# Patient Record
Sex: MALE | Race: WHITE | NOT HISPANIC OR LATINO | Employment: UNEMPLOYED | URBAN - METROPOLITAN AREA
[De-identification: names, ages, dates, MRNs, and addresses within clinical notes are randomized per-mention and may not be internally consistent; named-entity substitution may affect disease eponyms.]

---

## 2017-10-25 ENCOUNTER — TELEPHONE (OUTPATIENT)
Dept: PSYCHIATRY | Facility: CLINIC | Age: 22
End: 2017-10-25
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
## 2017-11-14 ENCOUNTER — TELEPHONE (OUTPATIENT)
Dept: PSYCHIATRY | Facility: CLINIC | Age: 22
End: 2017-11-14

## 2017-11-14 NOTE — TELEPHONE ENCOUNTER
First Episode Psychosis   Strengths Program  Outgoing Voicemail Message  [facility]      Left a detailed voicemail message for Maria Antonia Cordero ([phone]) on 11/3/17 and 11/14/17 introducing self, reason for call;  Following up on a referral to the First Episode of Psychosis program previously made by former VIJAY Andre, calling to get addt info and discussing scheduling options.  Writer requested call back. Included writer's direct contact information and availability.     Plan: Will follow-up again later this week, will await return call.     Please call or EPIC message with any questions or concerns.    CARMEN Vincent, SW  [phone]

## 2017-11-16 ENCOUNTER — TELEPHONE (OUTPATIENT)
Dept: PSYCHIATRY | Facility: CLINIC | Age: 22
End: 2017-11-16

## 2017-11-16 NOTE — TELEPHONE ENCOUNTER
First Episode Psychosis Program   Referral Follow-up  [facility]      Patient Name:  Shawn Gunderson  /Age:  1995 (22 year old)    Intervention: PC to Ramón Em CM ([phone]).  This is my third and final attempt. VMM left with a detailed message.      Status of Referral: Pending.     Plan: Phone screen needed.  Will determine if appropriate for a comprehensive assessment after phone screen is completed.  Requested CM call me back either way regarding pt's interest to continue to pursue this referral.  Will await return call.       Please call or EPIC message with any questions or concerns.    CARMEN Vincent, [facility]  [phone]

## 2017-11-27 ENCOUNTER — TELEPHONE (OUTPATIENT)
Dept: PSYCHIATRY | Facility: CLINIC | Age: 22
End: 2017-11-27

## 2017-11-27 NOTE — TELEPHONE ENCOUNTER
First Episode Psychosis Program   Referral Follow-up  [facility]      Patient Name:  Shawn Gunderson  /Age:  1995 (22 year old)    Intervention: Received an additional in-basket referral from pt's , as they were attempting to reach me for completing a phone screen.        JEWELS FW: FE referral from   Received: 6 days ago       Yu Steiner, [facility]  Candie Milligan LGSW       Phone Number: [phone]                     You previously managed a referral from this patient's CM.... So sending you this one. Thanks! Yu            Previous Messages       ----- Message -----      From: Margaux Ly      Sent: 2017  10:01 AM        To: Me First Episode Referral   Subject: FE referral from            FE Referral from Family     -Caller's Name: Gil   -Phone: [phone], Is it okay to leave a detailed voicemail: Yes   -Relationship:    -Patient's Name: Kiko Gunderosn   -: 1995   -Phone: [phone]   -Does the patient know you are calling? Yes     -Any current or past diagnoses? Depression, Bipolar, Schizophrenia, delusions   -If yes, when were they diagnosed? Dx years ago   -How long has patient been taking medication to treat psychosis?   -Have they ever had a significant head injury? Not to their knowledge   -Is there a history of a diagnosis of autism spectrum disorder? No   -Is there a history of a diagnosis of borderline personality disorder? No   -Is here a history of a developmental delay? No     -What are the patient's current symptoms and how long have they been experiencing them?   He doesn't talk a lot to anyone. He is always outside smoking cigarettes. He walks around the perimeter of the house for hours and is talking to himself. He has always believed the government is hacked into his phone and watching him.     -Is the patient seeing any mental health providers  currently, and if so, who/where? [facility]     -What services is are you interested in (e.g. med management, individual therapy, family therapy, group therapy)? Med Management and therapy     -Are you interested in our First Episode Psychosis Family Education & Support Group? No     ROUTE this message/referral to the following pool: P  ME FIRST EPISODE REFERRAL ([id number])                           Status of Referral: Pending    Plan: Will contact to complete phone screen and scheduling if appropriate.        Please call or EPIC message with any questions or concerns.    CARMEN Vincent, [facility]  [phone]

## 2017-11-27 NOTE — TELEPHONE ENCOUNTER
First Episode Psychosis Program   Referral Follow-up  [facility]      Patient Name:  Shawn Gunderson  /Age:  1995 (22 year old)    Intervention: Brief PC with sean Cordero's .  He was unable to speak in detail at the time due to privacy concerns while out in the community.  We arranged to talk tomorrow 17 at 3pm.      Status of Referral: Pending.    Plan: Will complete phone screen.      Get more details about patient's current involvement with mental health services, and what he has received this far, to determine if the First Episode of Psychosis, Strengths Program is able to offer anything different which would impact his care and trajectory for recovery.  If unable to schedule within the Strengths Program, will coordinate with our intake team to be set up an appointment with a Nurse Practitioner or Resident for ongoing care.      Information observed from chart impacting acceptance to Strengths Program:  Multiple Episode schizophrenia with first dx in , living in group home (capacity to engage in treatment).        CARMEN Vincent, ALEA  First Episode Psychosis Program    & Family Clinician  Direct Phone: [phone]  Fax: [phone]    [facility]  [facility], 2nd floor  [address], [address]  [place] [address]

## 2017-11-28 ENCOUNTER — TELEPHONE (OUTPATIENT)
Dept: PSYCHIATRY | Facility: CLINIC | Age: 22
End: 2017-11-28

## 2017-11-28 NOTE — TELEPHONE ENCOUNTER
First Episode Psychosis Program   Referral Follow-up  [facility]      Patient Name:  Shawn Gunderson  /Age:  1995 (22 year old)    Intervention:   Left a detailed voicemail message for tG Cordero's  ([phone]) introducing self, reason for call;  Following up on the referral and to complete phone screen.  Writer requested call back. Included writer's direct contact information and availability.     Status of Referral: Pending    Plan: Await return call.  Attempt again in the near future if I do not hear back.        Please call or EPIC message with any questions or concerns.    CARMEN Vincent, [facility]  [phone]

## 2017-11-29 ENCOUNTER — TELEPHONE (OUTPATIENT)
Dept: PSYCHIATRY | Facility: CLINIC | Age: 22
End: 2017-11-29
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
## 2018-01-09 ENCOUNTER — TELEPHONE (OUTPATIENT)
Dept: PSYCHIATRY | Facility: CLINIC | Age: 23
End: 2018-01-09

## 2018-01-09 NOTE — TELEPHONE ENCOUNTER
First Episode Psychosis Program   Referral Follow-up  [facility]      Patient Name:  Shawn Gunderson  /Age:  1995 (22 year old)    Intervention: Spoke with patient's group home staff regarding further scheduling with First Episode Program.  Informed by staff that patient is currently in extended hospitalization and they will not be scheduling at this time.  Group home staff will contact clinic after patient is discharged if they would like to proceed with scheduling.  Patient has been conditionally approved to schedule an evaluation in an overbook spot.  Scheduling options will be reassessed once clinic is contacted again.    Status of Referral: declined services at this time    Plan: will await call back      Margaux Lawson,

## 2020-08-17 PROBLEM — S82.51XK: Status: ACTIVE | Noted: 2019-08-21

## 2020-08-17 PROBLEM — I10 HYPERTENSION, BENIGN ESSENTIAL, GOAL BELOW 140/90: Status: ACTIVE | Noted: 2017-03-13

## 2020-08-17 PROBLEM — N28.9 RENAL DYSFUNCTION: Status: ACTIVE | Noted: 2019-08-29

## 2020-08-17 PROBLEM — F11.20 OPIOID TYPE DEPENDENCE, CONTINUOUS (H): Status: ACTIVE | Noted: 2020-08-17

## 2020-08-17 PROBLEM — F48.9 MENTAL HEALTH PROBLEM: Status: ACTIVE | Noted: 2020-03-13

## 2020-08-17 PROBLEM — F90.9 ATTENTION DEFICIT HYPERACTIVITY DISORDER (ADHD): Status: ACTIVE | Noted: 2017-06-29

## 2020-08-17 PROBLEM — Z72.0 TOBACCO ABUSE: Status: ACTIVE | Noted: 2020-04-21

## 2020-08-17 PROBLEM — F81.9 SPECIFIC DEVELOPMENTAL LEARNING DIFFICULTY: Status: ACTIVE | Noted: 2020-08-17

## 2020-08-17 PROBLEM — F41.1 ANXIETY STATE: Status: ACTIVE | Noted: 2020-08-17

## 2020-08-17 PROBLEM — F19.951: Status: ACTIVE | Noted: 2020-03-19

## 2020-08-17 PROBLEM — T48.3X4A: Status: ACTIVE | Noted: 2020-03-19

## 2020-08-17 NOTE — PROGRESS NOTES
"Subjective     Shawn Gunderson is a 25 year old male who presents to clinic today for the following health issues:    HPI       New Patient/Transfer of Care  Is looking for a referral for mental health services to be scheduled with a therapist.    Has not had blood test for therapeutic drug monitoring \"in a while\".    Treated for bipolar disorder, anxiety and insomnia.  Bipolar currently manic active - moderate per patient.    Needs psychiatrist - would like to got o [facility] - been there before.    Denies thoughts of self harm or harming others.    Patient states he last used meth and dextromehtorphan in may 2020.    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     Complex regional pain syndrome i of right lower limb     Depressive disorder     Dextromethorphan overdose, undetermined intent, initial encounter     Drug psychosis with hallucinations (H)     Hyperlipidemia     Hypertension, benign essential, goal below 140/90     Lisfranc dislocation     Mental health problem     Opioid abuse (H)     Opioid type dependence, continuous (H)     Other insomnia     Bipolar affective disorder, currently manic, moderate (H)     SCAR (generalized anxiety disorder)     Past Surgical History:   Procedure Laterality Date     DENTAL SURGERY      Had tooth get pushed into gums 2/2 traumatic injury.      Left thumb dislocation      Following blunt force trauma injury.      " "ORCHIECTOMY INGUINAL Right 10/10/2014    Procedure: ORCHIECTOMY INGUINAL;  Surgeon: Cameron Tavera MD;  Location: [facility] OR     Right hand punching injury repair      With severed nerves       Social History     Tobacco Use     Smoking status: Current Every Day Smoker     Packs/day: 0.25     Years: 6.00     Pack years: 1.50     Smokeless tobacco: Never Used   Substance Use Topics     Alcohol use: No     Alcohol/week: 1.7 standard drinks     Types: 2 Shots of liquor per week     Frequency: Never     Comment: no     Family History   Problem Relation Age of Onset     Depression Mother      Anxiety Disorder Mother      Bipolar Disorder Father      Breast Cancer Paternal Grandmother         59     Substance Abuse Brother         heroin     Anxiety Disorder Other         Runs on both sides of the family.          Current Outpatient Medications   Medication Sig Dispense Refill     LATUDA 60 MG TABS tablet take one tablet EVERY DAY       melatonin 3 MG tablet Take 6 mg by mouth       OLANZapine (ZYPREXA) 10 MG tablet Take 10 mg by mouth       OXcarbazepine (TRILEPTAL) 300 MG tablet take 2 tablets (600mg) TWICE DAILY       traZODone (DESYREL) 50 MG tablet Take 2 tablets (100 mg) by mouth nightly as needed for other (insomnia) 180 tablet 3     trihexyphenidyl (ARTANE) 2 MG tablet Take 3 mg by mouth       Allergies   Allergen Reactions     Haloperidol Muscle Pain (Myalgia) and Other (See Comments)     \"makes me feel like I'm withdrawing off opioids\"  Thick tongue and restless legs  Thick tongue and restless legs  Restless leg       Risperidone Muscle Pain (Myalgia) and Other (See Comments)     \"gives me bad activation\"  Subjective reports of gynecomastia  Subjective reports of gynecomastia  gynecomastia         Reviewed and updated as needed this visit by Provider  Tobacco  Allergies  Meds  Problems  Med Hx  Surg Hx  Fam Hx         Review of Systems   C: NEGATIVE for fever, chills, change in weight  I: NEGATIVE " "for worrisome rashes, moles or lesions  E: NEGATIVE for vision changes or irritation  E/M: NEGATIVE for ear, mouth and throat problems  R: NEGATIVE for significant cough or SOB  CV: NEGATIVE for chest pain, palpitations or peripheral edema  GI: NEGATIVE for nausea, abdominal pain, heartburn, or change in bowel habits  : NEGATIVE for frequency, dysuria, or hematuria  N: NEGATIVE for weakness, dizziness or paresthesias  E: NEGATIVE for temperature intolerance, skin/hair changes  PSYCHIATRIC: see above      Objective    /88   Pulse 93   Temp 98.2  F (36.8  C) (Tympanic)   Resp 12   Ht 1.803 m (5' 11\")   Wt 106.9 kg (235 lb 9.6 oz)   SpO2 97%   BMI 32.86 kg/m    Body mass index is 32.86 kg/m .  Physical Exam   GENERAL: alert and no distress  EYES: no icterus, PERRLA  NECK: no tenderness, no adenopathy,  Thyroid not enlarged  RESP: lungs clear to auscultation - no rales, no rhonchi, no wheezes  CV: regular rates and rhythm, no murmur  MS: no edema  SKIN: no jaundice or rash  NEURO: no tremors; no akathesia  PSYCH: well-kempt,  linear thought process, normal speech, good insight; normal mood, appropriate affect, no suicidality, no aggression, no hallucination  ABD: rounded, nontender, liver edge not palpable.    Diagnostic Test Results:  none         Assessment & Plan     Shawn was seen today for [facility] care.    Diagnoses and all orders for this visit:    SCAR (generalized anxiety disorder)  -     MENTAL HEALTH REFERRAL  - Adult; Psychiatry; Psychiatry; Other: [facility] [phone]; We will contact you to schedule the appointment or please call with any questions    Bipolar affective disorder, currently manic, moderate (H)  -     MENTAL HEALTH REFERRAL  - Adult; Psychiatry; Psychiatry; Other: [facility] [phone]; We will contact you to schedule the appointment or please call with any questions    Other insomnia  -     traZODone (DESYREL) 50 MG tablet; Take 2 tablets (100 mg) by " mouth nightly as needed for other (insomnia)  -     MENTAL HEALTH REFERRAL  - Adult; Psychiatry; Psychiatry; Other: [facility] [phone]; We will contact you to schedule the appointment or please call with any questions    Encounter for therapeutic drug monitoring  -     CBC with platelets differential  -     Comprehensive metabolic panel  -     Lipid panel reflex to direct LDL Fasting; Future  -     TSH with free T4 reflex      Patient was advised of possible side effects of his meds.  Lab tests ordered fro therapeutic drug monitoring.  Fasting test to be done later.    Referral to his preferred psychiatry clinic has been placed.    Trazodone Rx verified and refilled. Safe use reinforced.          Patient Instructions   You will be contacted in 1-2 days for results of your lab tests.    Schedule appointment with psychiatry in [place], [facility] - this was your preferred psychiatry provider.    Trazodone prescription refill has been sent to [facility] [place].    Take all medications as prescribed before.    Get a flu shot in October 2020.      Return in about 6 months (around 2/18/2021) for if all conditions are stable..    Mina Rushing MD  [facility]

## 2020-08-18 ENCOUNTER — OFFICE VISIT (OUTPATIENT)
Dept: FAMILY MEDICINE | Facility: CLINIC | Age: 25
End: 2020-08-18
Payer: COMMERCIAL

## 2020-08-18 VITALS
RESPIRATION RATE: 12 BRPM | HEART RATE: 93 BPM | OXYGEN SATURATION: 97 % | SYSTOLIC BLOOD PRESSURE: 134 MMHG | DIASTOLIC BLOOD PRESSURE: 88 MMHG | WEIGHT: 235.6 LBS | HEIGHT: 71 IN | BODY MASS INDEX: 32.98 KG/M2 | TEMPERATURE: 98.2 F

## 2020-08-18 DIAGNOSIS — Z51.81 ENCOUNTER FOR THERAPEUTIC DRUG MONITORING: ICD-10-CM

## 2020-08-18 DIAGNOSIS — G47.09 OTHER INSOMNIA: ICD-10-CM

## 2020-08-18 DIAGNOSIS — F41.1 GAD (GENERALIZED ANXIETY DISORDER): Primary | ICD-10-CM

## 2020-08-18 DIAGNOSIS — F31.12 BIPOLAR AFFECTIVE DISORDER, CURRENTLY MANIC, MODERATE (H): ICD-10-CM

## 2020-08-18 LAB
ALBUMIN SERPL-MCNC: 3.9 G/DL (ref 3.4–5)
ALP SERPL-CCNC: 101 U/L (ref 40–150)
ALT SERPL W P-5'-P-CCNC: 69 U/L (ref 0–70)
ANION GAP SERPL CALCULATED.3IONS-SCNC: 5 MMOL/L (ref 3–14)
AST SERPL W P-5'-P-CCNC: 37 U/L (ref 0–45)
BASOPHILS # BLD AUTO: 0 10E9/L (ref 0–0.2)
BASOPHILS NFR BLD AUTO: 0.3 %
BILIRUB SERPL-MCNC: 0.3 MG/DL (ref 0.2–1.3)
BUN SERPL-MCNC: 22 MG/DL (ref 7–30)
CALCIUM SERPL-MCNC: 8.8 MG/DL (ref 8.5–10.1)
CHLORIDE SERPL-SCNC: 105 MMOL/L (ref 94–109)
CO2 SERPL-SCNC: 25 MMOL/L (ref 20–32)
CREAT SERPL-MCNC: 1 MG/DL (ref 0.66–1.25)
DIFFERENTIAL METHOD BLD: NORMAL
EOSINOPHIL # BLD AUTO: 0.2 10E9/L (ref 0–0.7)
EOSINOPHIL NFR BLD AUTO: 2.7 %
ERYTHROCYTE [DISTWIDTH] IN BLOOD BY AUTOMATED COUNT: 12.7 % (ref 10–15)
GFR SERPL CREATININE-BSD FRML MDRD: >90 ML/MIN/{1.73_M2}
GLUCOSE SERPL-MCNC: 101 MG/DL (ref 70–99)
HCT VFR BLD AUTO: 46.3 % (ref 40–53)
HGB BLD-MCNC: 15.5 G/DL (ref 13.3–17.7)
LYMPHOCYTES # BLD AUTO: 2.7 10E9/L (ref 0.8–5.3)
LYMPHOCYTES NFR BLD AUTO: 42.5 %
MCH RBC QN AUTO: 28.2 PG (ref 26.5–33)
MCHC RBC AUTO-ENTMCNC: 33.5 G/DL (ref 31.5–36.5)
MCV RBC AUTO: 84 FL (ref 78–100)
MONOCYTES # BLD AUTO: 0.7 10E9/L (ref 0–1.3)
MONOCYTES NFR BLD AUTO: 11.9 %
NEUTROPHILS # BLD AUTO: 2.7 10E9/L (ref 1.6–8.3)
NEUTROPHILS NFR BLD AUTO: 42.6 %
PLATELET # BLD AUTO: 202 10E9/L (ref 150–450)
POTASSIUM SERPL-SCNC: 4.2 MMOL/L (ref 3.4–5.3)
PROT SERPL-MCNC: 7.7 G/DL (ref 6.8–8.8)
RBC # BLD AUTO: 5.5 10E12/L (ref 4.4–5.9)
SODIUM SERPL-SCNC: 135 MMOL/L (ref 133–144)
TSH SERPL DL<=0.005 MIU/L-ACNC: 1.63 MU/L (ref 0.4–4)
WBC # BLD AUTO: 6.2 10E9/L (ref 4–11)

## 2020-08-18 PROCEDURE — 99204 OFFICE O/P NEW MOD 45 MIN: CPT | Performed by: FAMILY MEDICINE

## 2020-08-18 PROCEDURE — 80050 GENERAL HEALTH PANEL: CPT | Performed by: FAMILY MEDICINE

## 2020-08-18 PROCEDURE — 36415 COLL VENOUS BLD VENIPUNCTURE: CPT | Performed by: FAMILY MEDICINE

## 2020-08-18 RX ORDER — OLANZAPINE 10 MG/1
10 TABLET ORAL
COMMUNITY
Start: 2020-06-09

## 2020-08-18 RX ORDER — TRIHEXYPHENIDYL HYDROCHLORIDE 2 MG/1
3 TABLET ORAL
COMMUNITY
Start: 2020-06-09 | End: 2020-08-26

## 2020-08-18 RX ORDER — LURASIDONE HYDROCHLORIDE 60 MG/1
TABLET, FILM COATED ORAL
COMMUNITY
Start: 2020-08-04 | End: 2020-08-27

## 2020-08-18 RX ORDER — TRAZODONE HYDROCHLORIDE 50 MG/1
100 TABLET, FILM COATED ORAL
Qty: 180 TABLET | Refills: 3 | Status: SHIPPED | OUTPATIENT
Start: 2020-08-18 | End: 2021-04-06

## 2020-08-18 RX ORDER — OXCARBAZEPINE 300 MG/1
TABLET, FILM COATED ORAL
COMMUNITY
Start: 2020-08-04 | End: 2020-08-27

## 2020-08-18 RX ORDER — LANOLIN ALCOHOL/MO/W.PET/CERES
6 CREAM (GRAM) TOPICAL
COMMUNITY
Start: 2020-06-09 | End: 2020-08-27

## 2020-08-18 SDOH — HEALTH STABILITY: MENTAL HEALTH: HOW OFTEN DO YOU HAVE A DRINK CONTAINING ALCOHOL?: NEVER

## 2020-08-18 ASSESSMENT — MIFFLIN-ST. JEOR: SCORE: 2065.8

## 2020-08-18 NOTE — PATIENT INSTRUCTIONS
You will be contacted in 1-2 days for results of your lab tests.    Schedule appointment with psychiatry in [facility] - this was your preferred psychiatry provider.    Trazodone prescription refill has been sent to [facility] [place].    Take all medications as prescribed before.    Get a flu shot in October 2020.

## 2020-08-18 NOTE — NURSING NOTE
"Initial /88   Pulse 93   Temp 98.2  F (36.8  C) (Tympanic)   Resp 12   Ht 1.803 m (5' 11\")   Wt 106.9 kg (235 lb 9.6 oz)   SpO2 97%   BMI 32.86 kg/m   Estimated body mass index is 32.86 kg/m  as calculated from the following:    Height as of this encounter: 1.803 m (5' 11\").    Weight as of this encounter: 106.9 kg (235 lb 9.6 oz). .      "

## 2020-08-18 NOTE — LETTER
August 20, 2020      Shawn Maravilla Jalyn  [address] [place] [address]        Dear ,    We are writing to inform you of your test results.    Your test results fall within the expected range(s) or remain unchanged from previous results.  Please continue with current treatment plan.    Resulted Orders   CBC with platelets differential   Result Value Ref Range    WBC 6.2 4.0 - 11.0 10e9/L    RBC Count 5.50 4.4 - 5.9 10e12/L    Hemoglobin 15.5 13.3 - 17.7 g/dL    Hematocrit 46.3 40.0 - 53.0 %    MCV 84 78 - 100 fl    MCH 28.2 26.5 - 33.0 pg    MCHC 33.5 31.5 - 36.5 g/dL    RDW 12.7 10.0 - 15.0 %    Platelet Count 202 150 - 450 10e9/L    % Neutrophils 42.6 %    % Lymphocytes 42.5 %    % Monocytes 11.9 %    % Eosinophils 2.7 %    % Basophils 0.3 %    Absolute Neutrophil 2.7 1.6 - 8.3 10e9/L    Absolute Lymphocytes 2.7 0.8 - 5.3 10e9/L    Absolute Monocytes 0.7 0.0 - 1.3 10e9/L    Absolute Eosinophils 0.2 0.0 - 0.7 10e9/L    Absolute Basophils 0.0 0.0 - 0.2 10e9/L    Diff Method Automated Method    Comprehensive metabolic panel   Result Value Ref Range    Sodium 135 133 - 144 mmol/L    Potassium 4.2 3.4 - 5.3 mmol/L    Chloride 105 94 - 109 mmol/L    Carbon Dioxide 25 20 - 32 mmol/L    Anion Gap 5 3 - 14 mmol/L    Glucose 101 (H) 70 - 99 mg/dL      Comment:      Non Fasting    Urea Nitrogen 22 7 - 30 mg/dL    Creatinine 1.00 0.66 - 1.25 mg/dL    GFR Estimate >90 >60 mL/min/[1.73_m2]      Comment:      Non  GFR Calc  Starting 12/18/2018, serum creatinine based estimated GFR (eGFR) will be   calculated using the Chronic Kidney Disease Epidemiology Collaboration   (CKD-EPI) equation.      GFR Estimate If Black >90 >60 mL/min/[1.73_m2]      Comment:       GFR Calc  Starting 12/18/2018, serum creatinine based estimated GFR (eGFR) will be   calculated using the Chronic Kidney Disease Epidemiology Collaboration   (CKD-EPI) equation.      Calcium 8.8 8.5 - 10.1 mg/dL     Bilirubin Total 0.3 0.2 - 1.3 mg/dL    Albumin 3.9 3.4 - 5.0 g/dL    Protein Total 7.7 6.8 - 8.8 g/dL    Alkaline Phosphatase 101 40 - 150 U/L    ALT 69 0 - 70 U/L    AST 37 0 - 45 U/L   TSH with free T4 reflex   Result Value Ref Range    TSH 1.63 0.40 - 4.00 mU/L       If you have any questions or concerns, please call the clinic at the number listed above.       Sincerely,        Mina Rushing MD

## 2020-08-26 DIAGNOSIS — F31.12 BIPOLAR AFFECTIVE DISORDER, CURRENTLY MANIC, MODERATE (H): Primary | ICD-10-CM

## 2020-08-26 RX ORDER — TRIHEXYPHENIDYL HYDROCHLORIDE 2 MG/1
TABLET ORAL
Qty: 45 TABLET | Refills: 0 | Status: SHIPPED | OUTPATIENT
Start: 2020-08-26 | End: 2020-08-26

## 2020-08-26 RX ORDER — TRIHEXYPHENIDYL HYDROCHLORIDE 2 MG/1
TABLET ORAL
Qty: 135 TABLET | Refills: 0 | Status: SHIPPED | OUTPATIENT
Start: 2020-08-26 | End: 2020-09-16

## 2020-08-26 NOTE — TELEPHONE ENCOUNTER
Patient was supposed to see phsyciatry.  Artane should be prescribed by psychiatry.    Refilled this only for 30 days. He needs to see psychiatry for his multiple meds.    Please remind patient of the above. Please call patient.

## 2020-08-26 NOTE — TELEPHONE ENCOUNTER
Requested Prescriptions   Pending Prescriptions Disp Refills     trihexyphenidyl (ARTANE) 2 MG tablet [Pharmacy Med Name: trihexyphenidyl 2 mg tablet] 135 tablet 0     Sig: Take 1&1/2 tablets(3mg) three times a day       There is no refill protocol information for this order

## 2020-08-26 NOTE — LETTER
August 27, 2020      Shawn Downeysey Jalyn  [address] [place] [address]        Dear Shawn,     We received a refill request for your Artane, Latuda, melatonin, Zyprexa, Trileptal, and trazodone medications.  This medication has been refilled.  Artane was refilled for 30 days as this medication should be prescribed by psychiatry.  Please schedule an appointment with your pshychiatrist for refills of this medication.    Sincerely,        Mina Rushing MD

## 2020-08-26 NOTE — TELEPHONE ENCOUNTER
Routing refill request to provider for review/approval because:  Medication is reported/historical    Meron East RN

## 2020-08-28 RX ORDER — OLANZAPINE 10 MG/1
TABLET ORAL
Qty: 30 TABLET | Refills: 0 | OUTPATIENT
Start: 2020-08-28

## 2020-08-28 NOTE — TELEPHONE ENCOUNTER
"Requested Prescriptions   Pending Prescriptions Disp Refills     OLANZapine (ZYPREXA) 10 MG tablet [Pharmacy Med Name: olanzapine 10 mg tablet] 30 tablet 0     Sig: take one tablet EVERY DAY AS NEEDED agitation       Antipsychotic Medications Failed - 8/27/2020  1:29 PM        Failed - Lipid panel on file within the past 12 months     No lab results found.            Passed - Blood pressure under 140/90 in past 12 months     BP Readings from Last 3 Encounters:   08/18/20 134/88   10/12/15 121/84   10/08/15 127/73                 Passed - Patient is 12 years of age or older        Passed - CBC on file in past 12 months     Recent Labs   Lab Test 08/18/20  1655   WBC 6.2   RBC 5.50   HGB 15.5   HCT 46.3                    Passed - Heart Rate on file within past 12 months     Pulse Readings from Last 3 Encounters:   08/18/20 93   10/12/15 99   10/08/15 89               Passed - A1c or Glucose on file in past 12 months     Recent Labs   Lab Test 08/18/20  1655   *       Please review patients last 3 weights. If a weight gain of >10 lbs exists, you may refill the prescription once after instructing the patient to schedule an appointment within the next 30 days.    Wt Readings from Last 3 Encounters:   08/18/20 106.9 kg (235 lb 9.6 oz)   10/12/15 102.8 kg (226 lb 9.6 oz)   10/08/15 103.5 kg (228 lb 1.6 oz)             Passed - Medication is active on med list        Passed - Recent (6 mo) or future (30 days) visit within the authorizing provider's specialty     Patient had office visit in the last 6 months or has a visit in the next 30 days with authorizing provider or within the authorizing provider's specialty.  See \"Patient Info\" tab in inbasket, or \"Choose Columns\" in Meds & Orders section of the refill encounter.                 "

## 2020-09-16 ENCOUNTER — TELEPHONE (OUTPATIENT)
Dept: FAMILY MEDICINE | Facility: CLINIC | Age: 25
End: 2020-09-16

## 2020-09-16 DIAGNOSIS — F31.12 BIPOLAR AFFECTIVE DISORDER, CURRENTLY MANIC, MODERATE (H): ICD-10-CM

## 2020-09-16 RX ORDER — TRIHEXYPHENIDYL HYDROCHLORIDE 2 MG/1
TABLET ORAL
Qty: 135 TABLET | Refills: 0 | Status: SHIPPED | OUTPATIENT
Start: 2020-09-16 | End: 2021-04-06

## 2020-09-16 NOTE — TELEPHONE ENCOUNTER
Please contact patient.  Patient needs to see psychiatry to continue all his other medication refills.  He has been referred to one in the city per his request but it does not seem he has schedule this.  For psych med management, I strongly advise that he be referred to collaborative psychiatry if he is unable to go to his preferred psychiatry clinic.

## 2020-09-16 NOTE — LETTER
September 23, 2020      Shawn Maravilla Jalyn  [address] [place] [address]        Dear Shawn,     We received a refill request for your trihexyphenidyl medication.  This medication has been refilled for 30 days, however, you are to contact your psychiatrist for future refills.  If you have not scheduled an appointment, please schedule an appointment with your psychiatrist so you do not run out of medication.  Please call [phone] if you have any questions.        Sincerely,        Mina Rushing MD

## 2020-09-16 NOTE — TELEPHONE ENCOUNTER
Requested Prescriptions   Pending Prescriptions Disp Refills     trihexyphenidyl (ARTANE) 2 MG tablet [Pharmacy Med Name: trihexyphenidyl 2 mg tablet] 135 tablet 0     Sig: Take 1&1/2 tablets(3mg) three times a day..       There is no refill protocol information for this order

## 2020-09-23 NOTE — TELEPHONE ENCOUNTER
3rd attempt.  Left message for patient to return call to clinic.  Letter sent.  Barbi CERVANTES RN BSN

## 2021-03-24 ENCOUNTER — MEDICAL CORRESPONDENCE (OUTPATIENT)
Dept: HEALTH INFORMATION MANAGEMENT | Facility: CLINIC | Age: 26
End: 2021-03-24

## 2021-03-30 ENCOUNTER — MEDICAL CORRESPONDENCE (OUTPATIENT)
Dept: HEALTH INFORMATION MANAGEMENT | Facility: CLINIC | Age: 26
End: 2021-03-30

## 2021-03-30 DIAGNOSIS — E55.9 AVITAMINOSIS D: Primary | ICD-10-CM

## 2021-03-30 DIAGNOSIS — E55.9 AVITAMINOSIS D: ICD-10-CM

## 2021-03-30 DIAGNOSIS — Z79.899 ENCOUNTER FOR LONG-TERM (CURRENT) USE OF HIGH-RISK MEDICATION: ICD-10-CM

## 2021-03-30 LAB
ALBUMIN SERPL-MCNC: 4 G/DL (ref 3.4–5)
ALP SERPL-CCNC: 93 U/L (ref 40–150)
ALT SERPL W P-5'-P-CCNC: 27 U/L (ref 0–70)
ANION GAP SERPL CALCULATED.3IONS-SCNC: 9 MMOL/L (ref 3–14)
AST SERPL W P-5'-P-CCNC: 17 U/L (ref 0–45)
BASOPHILS # BLD AUTO: 0 10E9/L (ref 0–0.2)
BASOPHILS NFR BLD AUTO: 0.1 %
BILIRUB SERPL-MCNC: 1.7 MG/DL (ref 0.2–1.3)
BUN SERPL-MCNC: 20 MG/DL (ref 7–30)
CALCIUM SERPL-MCNC: 8.5 MG/DL (ref 8.5–10.1)
CHLORIDE SERPL-SCNC: 103 MMOL/L (ref 94–109)
CO2 SERPL-SCNC: 23 MMOL/L (ref 20–32)
CREAT SERPL-MCNC: 1.18 MG/DL (ref 0.66–1.25)
DIFFERENTIAL METHOD BLD: ABNORMAL
EOSINOPHIL # BLD AUTO: 0 10E9/L (ref 0–0.7)
EOSINOPHIL NFR BLD AUTO: 0.1 %
ERYTHROCYTE [DISTWIDTH] IN BLOOD BY AUTOMATED COUNT: 13.2 % (ref 10–15)
FOLATE SERPL-MCNC: 13.7 NG/ML
GFR SERPL CREATININE-BSD FRML MDRD: 84 ML/MIN/{1.73_M2}
GLUCOSE SERPL-MCNC: 114 MG/DL (ref 70–99)
HCT VFR BLD AUTO: 48.2 % (ref 40–53)
HGB BLD-MCNC: 16 G/DL (ref 13.3–17.7)
LITHIUM SERPL-SCNC: 0.49 MMOL/L (ref 0.6–1.2)
LYMPHOCYTES # BLD AUTO: 0.8 10E9/L (ref 0.8–5.3)
LYMPHOCYTES NFR BLD AUTO: 5.9 %
MCH RBC QN AUTO: 27.8 PG (ref 26.5–33)
MCHC RBC AUTO-ENTMCNC: 33.2 G/DL (ref 31.5–36.5)
MCV RBC AUTO: 84 FL (ref 78–100)
MONOCYTES # BLD AUTO: 0.6 10E9/L (ref 0–1.3)
MONOCYTES NFR BLD AUTO: 4.6 %
NEUTROPHILS # BLD AUTO: 11.3 10E9/L (ref 1.6–8.3)
NEUTROPHILS NFR BLD AUTO: 89.3 %
PLATELET # BLD AUTO: 264 10E9/L (ref 150–450)
POTASSIUM SERPL-SCNC: 3.5 MMOL/L (ref 3.4–5.3)
PROLACTIN SERPL-MCNC: 12 UG/L (ref 2–18)
PROT SERPL-MCNC: 8.1 G/DL (ref 6.8–8.8)
RBC # BLD AUTO: 5.76 10E12/L (ref 4.4–5.9)
SODIUM SERPL-SCNC: 135 MMOL/L (ref 133–144)
T3 SERPL-MCNC: 86 NG/DL (ref 60–181)
T4 FREE SERPL-MCNC: 0.88 NG/DL (ref 0.76–1.46)
T4 SERPL-MCNC: 7.6 UG/DL (ref 4.5–13.9)
TSH SERPL DL<=0.005 MIU/L-ACNC: 0.57 MU/L (ref 0.4–4)
VIT B12 SERPL-MCNC: 732 PG/ML (ref 193–986)
WBC # BLD AUTO: 12.7 10E9/L (ref 4–11)

## 2021-03-30 PROCEDURE — 82607 VITAMIN B-12: CPT | Performed by: NURSE PRACTITIONER

## 2021-03-30 PROCEDURE — 80178 ASSAY OF LITHIUM: CPT | Performed by: NURSE PRACTITIONER

## 2021-03-30 PROCEDURE — 82306 VITAMIN D 25 HYDROXY: CPT | Performed by: NURSE PRACTITIONER

## 2021-03-30 PROCEDURE — 80050 GENERAL HEALTH PANEL: CPT | Performed by: NURSE PRACTITIONER

## 2021-03-30 PROCEDURE — 84146 ASSAY OF PROLACTIN: CPT | Performed by: NURSE PRACTITIONER

## 2021-03-30 PROCEDURE — 84439 ASSAY OF FREE THYROXINE: CPT | Performed by: NURSE PRACTITIONER

## 2021-03-30 PROCEDURE — 84480 ASSAY TRIIODOTHYRONINE (T3): CPT | Performed by: NURSE PRACTITIONER

## 2021-03-30 PROCEDURE — 82746 ASSAY OF FOLIC ACID SERUM: CPT | Performed by: NURSE PRACTITIONER

## 2021-03-30 PROCEDURE — 36415 COLL VENOUS BLD VENIPUNCTURE: CPT | Performed by: NURSE PRACTITIONER

## 2021-03-31 LAB — DEPRECATED CALCIDIOL+CALCIFEROL SERPL-MC: 16 UG/L (ref 20–75)

## 2021-04-06 ENCOUNTER — OFFICE VISIT (OUTPATIENT)
Dept: FAMILY MEDICINE | Facility: CLINIC | Age: 26
End: 2021-04-06
Payer: COMMERCIAL

## 2021-04-06 VITALS
OXYGEN SATURATION: 96 % | HEART RATE: 103 BPM | HEIGHT: 72 IN | WEIGHT: 256 LBS | TEMPERATURE: 97.6 F | SYSTOLIC BLOOD PRESSURE: 133 MMHG | BODY MASS INDEX: 34.67 KG/M2 | DIASTOLIC BLOOD PRESSURE: 88 MMHG

## 2021-04-06 DIAGNOSIS — Z00.00 ROUTINE GENERAL MEDICAL EXAMINATION AT A HEALTH CARE FACILITY: Primary | ICD-10-CM

## 2021-04-06 DIAGNOSIS — F25.0 SCHIZOAFFECTIVE DISORDER, BIPOLAR TYPE (H): ICD-10-CM

## 2021-04-06 DIAGNOSIS — F41.1 GAD (GENERALIZED ANXIETY DISORDER): ICD-10-CM

## 2021-04-06 DIAGNOSIS — Z13.6 CARDIOVASCULAR SCREENING; LDL GOAL LESS THAN 160: ICD-10-CM

## 2021-04-06 DIAGNOSIS — Z13.1 SCREENING FOR DIABETES MELLITUS: ICD-10-CM

## 2021-04-06 LAB
CHOLEST SERPL-MCNC: 181 MG/DL
GLUCOSE SERPL-MCNC: 105 MG/DL (ref 70–99)
HDLC SERPL-MCNC: 39 MG/DL
LDLC SERPL CALC-MCNC: 108 MG/DL
NONHDLC SERPL-MCNC: 142 MG/DL
TRIGL SERPL-MCNC: 171 MG/DL

## 2021-04-06 PROCEDURE — 82947 ASSAY GLUCOSE BLOOD QUANT: CPT | Performed by: FAMILY MEDICINE

## 2021-04-06 PROCEDURE — 99395 PREV VISIT EST AGE 18-39: CPT | Performed by: FAMILY MEDICINE

## 2021-04-06 PROCEDURE — 36415 COLL VENOUS BLD VENIPUNCTURE: CPT | Performed by: FAMILY MEDICINE

## 2021-04-06 PROCEDURE — 80061 LIPID PANEL: CPT | Performed by: FAMILY MEDICINE

## 2021-04-06 RX ORDER — LITHIUM CARBONATE 450 MG
2 TABLET, EXTENDED RELEASE ORAL DAILY
COMMUNITY
Start: 2021-04-02

## 2021-04-06 RX ORDER — OLANZAPINE 15 MG/1
1 TABLET ORAL AT BEDTIME
COMMUNITY
Start: 2021-04-02

## 2021-04-06 ASSESSMENT — MIFFLIN-ST. JEOR: SCORE: 2174.21

## 2021-04-06 NOTE — PATIENT INSTRUCTIONS
Preventive Health Recommendations  Male Ages 26 - 39    Yearly exam:             See your health care provider every year in order to  o   Review health changes.   o   Discuss preventive care.    o   Review your medicines if your doctor has prescribed any.    You should be tested each year for STDs (sexually transmitted diseases), if you re at risk.     After age 35, talk to your provider about cholesterol testing. If you are at risk for heart disease, have your cholesterol tested at least every 5 years.     If you are at risk for diabetes, you should have a diabetes test (fasting glucose).  Shots: Get a flu shot each year. Get a tetanus shot every 10 years.     Nutrition:    Eat at least 5 servings of fruits and vegetables daily.     Eat whole-grain bread, whole-wheat pasta and brown rice instead of white grains and rice.     Get adequate Calcium and Vitamin D.     Lifestyle    Exercise for at least 150 minutes a week (30 minutes a day, 5 days a week). This will help you control your weight and prevent disease.     Limit alcohol to one drink per day.     No smoking.     Wear sunscreen to prevent skin cancer.     See your dentist every six months for an exam and cleaning.   At [facility], we strive to deliver an exceptional experience to you, every time we see you. If you receive a survey, please complete it as we do value your feedback.  If you have MyChart, you can expect to receive results automatically within 24 hours of their completion.  Your provider will send a note interpreting your results as well.   If you do not have MyChart, you should receive your results in about a week by mail.    Your care team:                            Family Medicine Internal Medicine   MD Mina Downey MD Shantel Branch-Fleming, MD Srinivasa Vaka, MD Katya Belousova, PA-C Megan Hill, APRN CNP Nam Ho, MD Pediatrics   Zhang Arias,  KIRAN Mann, MD Concepcion Lewis APRN CNP   MD Bibiana Miguel MD Deborah Mielke, MD Kim Thein, APRN [facility]      Clinic hours: Monday - Thursday 7 am-6 pm; Fridays 7 am-5 pm.   Urgent care: Monday - Friday 11 am-9 pm; Saturday and Sunday 9 am-5 pm.    Clinic: [phone]       [facility]: Monday - Thursday 8 am - 7 pm; Friday 8 am - 6 pm  [facility]: [phone]     Use [email or web address] for 24/7 diagnosis and treatment of dozens of conditions.

## 2021-04-06 NOTE — PROGRESS NOTES
SUBJECTIVE:   CC: Shawn Gunderson is an 25 year old male who presents for preventive health visit.       Patient has been advised of split billing requirements and indicates understanding: Yes  Healthy Habits:    Do you get at least three servings of calcium containing foods daily (dairy, green leafy vegetables, etc.)? yes    Amount of exercise or daily activities, outside of work: 7 day(s) per week, 10-15 mins    Problems taking medications regularly No    Medication side effects: No    Have you had an eye exam in the past two years? no    Do you see a dentist twice per year? no    Do you have sleep apnea, excessive snoring or daytime drowsiness?no      -Overeating & vomit episodes / heartburn: unable to give me timeline.     Today's PHQ-2 Score:   PHQ-2 ( 1999 [employer]) 4/6/2021 10/12/2015   Q1: Little interest or pleasure in doing things 0 0   Q2: Feeling down, depressed or hopeless 0 0   PHQ-2 Score 0 0       Abuse: Current or Past(Physical, Sexual or Emotional)- No  Do you feel safe in your environment? Yes    Have you ever done Advance Care Planning? (For example, a Health Directive, POLST, or a discussion with a medical provider or your loved ones about your wishes): No, advance care planning information given to patient to review.  Patient plans to discuss their wishes with loved ones or provider.      Social History     Tobacco Use     Smoking status: Current Every Day Smoker     Packs/day: 0.25     Years: 6.00     Pack years: 1.50     Types: Cigarettes     Smokeless tobacco: Never Used   Substance Use Topics     Alcohol use: No     Alcohol/week: 1.7 standard drinks     Types: 2 Shots of liquor per week     Frequency: Never     Comment: no     If you drink alcohol do you typically have >3 drinks per day or >7 drinks per week? No                      Last PSA: No results found for: PSA    Reviewed orders with patient. Reviewed health maintenance and updated orders accordingly - Yes  Lab work is in  process    Reviewed and updated as needed this visit by clinical staff  Tobacco  Allergies  Meds   Med Hx  Surg Hx  Fam Hx  Soc Hx        Reviewed and updated as needed this visit by Provider                    ROS:  CONSTITUTIONAL: NEGATIVE for fever, chills, change in weight  INTEGUMENTARY/SKIN: NEGATIVE for worrisome rashes, moles or lesions  EYES: NEGATIVE for vision changes or irritation  ENT: NEGATIVE for ear, mouth and throat problems  RESP: NEGATIVE for significant cough or SOB  CV: NEGATIVE for chest pain, palpitations or peripheral edema  GI: NEGATIVE for nausea, abdominal pain, heartburn, or change in bowel habits   male: negative for dysuria, hematuria, decreased urinary stream, erectile dysfunction, urethral discharge  MUSCULOSKELETAL: NEGATIVE for significant arthralgias or myalgia  NEURO: NEGATIVE for weakness, dizziness or paresthesias  PSYCHIATRIC: NEGATIVE for changes in mood or affect    OBJECTIVE:   /88 (BP Location: Right arm, Patient Position: Sitting, Cuff Size: Adult Regular)   Pulse 103   Temp 97.6  F (36.4  C) (Tympanic)   Ht 1.829 m (6')   Wt 116.1 kg (256 lb)   SpO2 96%   BMI 34.72 kg/m    EXAM:  GENERAL: healthy, alert and no distress  NECK: no adenopathy, no asymmetry, masses, or scars and thyroid normal to palpation  RESP: lungs clear to auscultation - no rales, rhonchi or wheezes  CV: regular rate and rhythm, normal S1 S2, no S3 or S4, no murmur, click or rub, no peripheral edema and peripheral pulses strong  ABDOMEN: soft, nontender, no hepatosplenomegaly, no masses and bowel sounds normal  MS: no gross musculoskeletal defects noted, no edema    Diagnostic Test Results:  Labs reviewed in Epic    ASSESSMENT/PLAN:   1. Routine general medical examination at a health care facility  As below.    2. Schizoaffective disorder, bipolar type (H)  Stable. Following Psychiatry.    3. SCAR (generalized anxiety disorder)  Stable.    4. CARDIOVASCULAR SCREENING; LDL GOAL LESS  THAN 160    - Lipid panel reflex to direct LDL Fasting    5. Screening for diabetes mellitus    - Glucose    Patient has been advised of split billing requirements and indicates understanding: Yes  COUNSELING:  Reviewed preventive health counseling, as reflected in patient instructions       Regular exercise       Healthy diet/nutrition       Vision screening    Estimated body mass index is 34.72 kg/m  as calculated from the following:    Height as of this encounter: 1.829 m (6').    Weight as of this encounter: 116.1 kg (256 lb).        He reports that he has been smoking cigarettes. He has a 1.50 pack-year smoking history. He has never used smokeless tobacco.  Tobacco Cessation Action Plan:   Information offered: Patient not interested at this time      Counseling Resources:  ATP IV Guidelines  Pooled Cohorts Equation Calculator  FRAX Risk Assessment  ICSI Preventive Guidelines  Dietary Guidelines for Americans, 2010  USDA's MyPlate  ASA Prophylaxis  Lung CA Screening    Fernando Simmons MD, MD  [facility]

## 2021-04-08 ENCOUNTER — TELEPHONE (OUTPATIENT)
Dept: FAMILY MEDICINE | Facility: CLINIC | Age: 26
End: 2021-04-08

## 2021-04-08 NOTE — TELEPHONE ENCOUNTER
Leatha Arzola NP from [facility] calling to get lab results for the lab orders she had placed as she has not received them.      Please fax labs to [phone]   Phone [phone]    especially last comprehensive and lithium level.    Rhea Tamayo, Lori Thakkar RN

## 2021-04-08 NOTE — TELEPHONE ENCOUNTER
Printed lab results and faxed to Leatha Arzola, [phone], right fax confirmed at 12:23 pm today, 4/8/2021.  Penny Sofia MA  [facility]  2nd Floor  Primary Care'

## 2021-04-23 ENCOUNTER — OFFICE VISIT (OUTPATIENT)
Dept: FAMILY MEDICINE | Facility: CLINIC | Age: 26
End: 2021-04-23
Payer: COMMERCIAL

## 2021-04-23 VITALS
TEMPERATURE: 97.7 F | SYSTOLIC BLOOD PRESSURE: 118 MMHG | DIASTOLIC BLOOD PRESSURE: 88 MMHG | OXYGEN SATURATION: 95 % | HEART RATE: 109 BPM | BODY MASS INDEX: 34.04 KG/M2 | RESPIRATION RATE: 16 BRPM | WEIGHT: 251 LBS

## 2021-04-23 DIAGNOSIS — R11.11 NON-INTRACTABLE VOMITING WITHOUT NAUSEA, UNSPECIFIED VOMITING TYPE: Primary | ICD-10-CM

## 2021-04-23 DIAGNOSIS — F31.12 BIPOLAR AFFECTIVE DISORDER, CURRENTLY MANIC, MODERATE (H): ICD-10-CM

## 2021-04-23 PROCEDURE — 99214 OFFICE O/P EST MOD 30 MIN: CPT | Performed by: PREVENTIVE MEDICINE

## 2021-04-23 RX ORDER — ONDANSETRON 4 MG/1
4 TABLET, ORALLY DISINTEGRATING ORAL EVERY 8 HOURS PRN
Qty: 30 TABLET | Refills: 1 | Status: SHIPPED | OUTPATIENT
Start: 2021-04-23

## 2021-04-23 ASSESSMENT — PAIN SCALES - GENERAL: PAINLEVEL: NO PAIN (0)

## 2021-04-23 NOTE — PATIENT INSTRUCTIONS
At [facility], we strive to deliver an exceptional experience to you, every time we see you. If you receive a survey, please complete it as we do value your feedback.  If you have MyChart, you can expect to receive results automatically within 24 hours of their completion.  Your provider will send a note interpreting your results as well.   If you do not have MyChart, you should receive your results in about a week by mail.    Your care team:                            Family Medicine Internal Medicine   MD Mina Downey MD Shantel Branch-Fleming, MD Srinivasa Vaka, MD Katya Belousova, PANY Lawrence, APRN CNP    Fernando Simmons, MD Pediatrics   Zhang Arias, PANY Mann, CNP MD Concepcion Mccormack APRN CNP   MD Bibiana Miguel MD Deborah Mielke, MD Naz Vides, APRN [facility]      Clinic hours: Monday - Thursday 7 am-6 pm; Fridays 7 am-5 pm.   Urgent care: Monday - Friday 10 am- 8 pm; Saturday and Sunday 9 am-5 pm.    Clinic: [phone]       [facility]: Monday - Thursday 8 am - 7 pm; Friday 8 am - 6 pm  [facility]: [phone]     Use [email or web address] for 24/7 diagnosis and treatment of dozens of conditions.

## 2021-04-23 NOTE — PROGRESS NOTES
Assessment & Plan     Non-intractable vomiting without nausea, unspecified vomiting type  -as needed medication prescribed  -if symptoms worsen then plan for labs and possible endoscopy   - ondansetron (ZOFRAN-ODT) 4 MG ODT tab  Dispense: 30 tablet; Refill: 1  -ER precautions: dehydration, fever, bleeding     Bipolar affective disorder, currently manic, moderate (H)  -per Psychiatry       20 minutes spent on the date of the encounter doing chart review, history and exam, documentation and further activities per the note         Return in about 2 weeks (around 5/7/2021) if symptoms worsen or fail to improve.    Gini Bah MD MPH    [facility] ROYER Escobar is a 25 year old who presents for the following health issues, here with group home attendant     HPI     Concern - occasional vomiting   Onset: couple of weeks   Description: patient states that he was sick a couple of weeks ago and is still vomiting from time to time. Patient states that he is still able to eat and drink normally with no issues. Patient states that he does not want any medications to take if given. Denies any other symptoms at this time.     No vomiting in the last 2 weeks  Appetite Normal  No abdominal pain  No urine symptoms  No new joint pains  May vomit at times   Feels more with overeating  Does not feel burning in the stomach  Non Bilious emesis, no hematemesis  No past EGD   NO GERD medication   No belching or burping  No burning in the chest  No bowel changes  No diarrhea or constipation  No melena or rectal bleeding   No fever   Group home would like something as needed on his medication list. Tends to have intermittent nausea and emesis     Mental health issues per Psychiatry         Review of Systems   Constitutional, HEENT, cardiovascular, pulmonary, gi and gu systems are negative, except as otherwise noted.      Objective    /88 (BP Location: Left arm, Patient Position: Chair, Cuff  Size: Adult Large)   Pulse 109   Temp 97.7  F (36.5  C) (Tympanic)   Resp 16   Wt 113.9 kg (251 lb)   SpO2 95%   BMI 34.04 kg/m    Body mass index is 34.04 kg/m .  Physical Exam   GENERAL APPEARANCE: healthy, alert and no distress  EYES: Eyes grossly normal to inspection and conjunctivae and sclerae normal  NECK: no adenopathy and trachea midline and normal to palpation  RESP: lungs clear to auscultation - no rales, rhonchi or wheezes  CV: regular rates and rhythm, normal S1 S2, no S3 or S4 and no murmur, click or rub  ABDOMEN: soft, non-tender and no rebound or guarding   MS: extremities normal- no gross deformities noted and peripheral pulses normal  SKIN: no suspicious lesions or rashes  NEURO: Normal strength and tone, mentation intact and speech normal  PSYCH: mentation appears normal      No results found for any visits on 04/23/21.    Office Visit on 04/06/2021   Component Date Value Ref Range Status     Cholesterol 04/06/2021 181  <200 mg/dL Final     Triglycerides 04/06/2021 171* <150 mg/dL Final    Comment: Borderline high:  150-199 mg/dl  High:             200-499 mg/dl  Very high:       >499 mg/dl       HDL Cholesterol 04/06/2021 39* >39 mg/dL Final     LDL Cholesterol Calculated 04/06/2021 108* <100 mg/dL Final    Comment: Above desirable:  100-129 mg/dl  Borderline High:  130-159 mg/dL  High:             160-189 mg/dL  Very high:       >189 mg/dl       Non HDL Cholesterol 04/06/2021 142* <130 mg/dL Final    Comment: Above Desirable:  130-159 mg/dl  Borderline high:  160-189 mg/dl  High:             190-219 mg/dl  Very high:       >219 mg/dl       Glucose 04/06/2021 105* 70 - 99 mg/dL Final

## 2021-04-26 ENCOUNTER — IMMUNIZATION (OUTPATIENT)
Dept: NURSING | Facility: CLINIC | Age: 26
End: 2021-04-26
Payer: COMMERCIAL

## 2021-04-26 PROCEDURE — 91300 PR COVID VAC PFIZER DIL RECON 30 MCG/0.3 ML IM: CPT

## 2021-04-26 PROCEDURE — 0001A PR COVID VAC PFIZER DIL RECON 30 MCG/0.3 ML IM: CPT

## 2021-05-05 DIAGNOSIS — Z79.899 ENCOUNTER FOR LONG-TERM (CURRENT) USE OF MEDICATIONS: Primary | ICD-10-CM

## 2021-05-05 LAB — LITHIUM SERPL-SCNC: 0.59 MMOL/L (ref 0.6–1.2)

## 2021-05-05 PROCEDURE — 80178 ASSAY OF LITHIUM: CPT | Performed by: NURSE PRACTITIONER

## 2021-05-05 PROCEDURE — 36415 COLL VENOUS BLD VENIPUNCTURE: CPT | Performed by: NURSE PRACTITIONER

## 2021-05-12 ENCOUNTER — MEDICAL CORRESPONDENCE (OUTPATIENT)
Dept: HEALTH INFORMATION MANAGEMENT | Facility: CLINIC | Age: 26
End: 2021-05-12

## 2021-05-17 ENCOUNTER — IMMUNIZATION (OUTPATIENT)
Dept: NURSING | Facility: CLINIC | Age: 26
End: 2021-05-17
Attending: FAMILY MEDICINE
Payer: COMMERCIAL

## 2021-05-17 PROCEDURE — 91300 PR COVID VAC PFIZER DIL RECON 30 MCG/0.3 ML IM: CPT

## 2021-05-17 PROCEDURE — 0002A PR COVID VAC PFIZER DIL RECON 30 MCG/0.3 ML IM: CPT

## 2021-05-24 DIAGNOSIS — Z79.899 ENCOUNTER FOR LONG-TERM (CURRENT) USE OF MEDICATIONS: Primary | ICD-10-CM

## 2021-05-24 LAB
ALBUMIN SERPL-MCNC: 4.5 G/DL (ref 3.4–5)
ALP SERPL-CCNC: 109 U/L (ref 40–150)
ALT SERPL W P-5'-P-CCNC: 33 U/L (ref 0–70)
AMPHETAMINES UR QL: NOT DETECTED NG/ML
ANION GAP SERPL CALCULATED.3IONS-SCNC: 3 MMOL/L (ref 3–14)
AST SERPL W P-5'-P-CCNC: 15 U/L (ref 0–45)
BARBITURATES UR QL SCN: NOT DETECTED NG/ML
BASOPHILS # BLD AUTO: 0.1 10E9/L (ref 0–0.2)
BASOPHILS NFR BLD AUTO: 0.8 %
BENZODIAZ UR QL SCN: NOT DETECTED NG/ML
BILIRUB SERPL-MCNC: 0.7 MG/DL (ref 0.2–1.3)
BUN SERPL-MCNC: 9 MG/DL (ref 7–30)
BUPRENORPHINE UR QL: NOT DETECTED NG/ML
CALCIUM SERPL-MCNC: 9.6 MG/DL (ref 8.5–10.1)
CANNABINOIDS UR QL: NOT DETECTED NG/ML
CHLORIDE SERPL-SCNC: 109 MMOL/L (ref 94–109)
CO2 SERPL-SCNC: 26 MMOL/L (ref 20–32)
COCAINE UR QL SCN: NOT DETECTED NG/ML
CREAT SERPL-MCNC: 1.22 MG/DL (ref 0.66–1.25)
D-METHAMPHET UR QL: ABNORMAL NG/ML
DIFFERENTIAL METHOD BLD: NORMAL
EOSINOPHIL # BLD AUTO: 0.3 10E9/L (ref 0–0.7)
EOSINOPHIL NFR BLD AUTO: 3 %
ERYTHROCYTE [DISTWIDTH] IN BLOOD BY AUTOMATED COUNT: 12.4 % (ref 10–15)
GFR SERPL CREATININE-BSD FRML MDRD: 80 ML/MIN/{1.73_M2}
GLUCOSE SERPL-MCNC: 113 MG/DL (ref 70–99)
HCT VFR BLD AUTO: 47.9 % (ref 40–53)
HGB BLD-MCNC: 15.5 G/DL (ref 13.3–17.7)
LITHIUM SERPL-SCNC: 0.97 MMOL/L (ref 0.6–1.2)
LYMPHOCYTES # BLD AUTO: 2.5 10E9/L (ref 0.8–5.3)
LYMPHOCYTES NFR BLD AUTO: 24.1 %
MCH RBC QN AUTO: 27.6 PG (ref 26.5–33)
MCHC RBC AUTO-ENTMCNC: 32.4 G/DL (ref 31.5–36.5)
MCV RBC AUTO: 85 FL (ref 78–100)
METHADONE UR QL SCN: NOT DETECTED NG/ML
MONOCYTES # BLD AUTO: 0.7 10E9/L (ref 0–1.3)
MONOCYTES NFR BLD AUTO: 6.9 %
NEUTROPHILS # BLD AUTO: 6.7 10E9/L (ref 1.6–8.3)
NEUTROPHILS NFR BLD AUTO: 65.2 %
OPIATES UR QL SCN: NOT DETECTED NG/ML
OXYCODONE UR QL SCN: NOT DETECTED NG/ML
PCP UR QL SCN: NOT DETECTED NG/ML
PLATELET # BLD AUTO: 281 10E9/L (ref 150–450)
POTASSIUM SERPL-SCNC: 3.8 MMOL/L (ref 3.4–5.3)
PROLACTIN SERPL-MCNC: 14 UG/L (ref 2–18)
PROPOXYPH UR QL: NOT DETECTED NG/ML
PROT SERPL-MCNC: 8.2 G/DL (ref 6.8–8.8)
RBC # BLD AUTO: 5.62 10E12/L (ref 4.4–5.9)
SODIUM SERPL-SCNC: 138 MMOL/L (ref 133–144)
TRICYCLICS UR QL SCN: NOT DETECTED NG/ML
WBC # BLD AUTO: 10.3 10E9/L (ref 4–11)

## 2021-05-24 PROCEDURE — 84146 ASSAY OF PROLACTIN: CPT | Performed by: NURSE PRACTITIONER

## 2021-05-24 PROCEDURE — 80061 LIPID PANEL: CPT | Performed by: NURSE PRACTITIONER

## 2021-05-24 PROCEDURE — 80053 COMPREHEN METABOLIC PANEL: CPT | Performed by: NURSE PRACTITIONER

## 2021-05-24 PROCEDURE — 85025 COMPLETE CBC W/AUTO DIFF WBC: CPT | Performed by: NURSE PRACTITIONER

## 2021-05-24 PROCEDURE — 36415 COLL VENOUS BLD VENIPUNCTURE: CPT | Performed by: NURSE PRACTITIONER

## 2021-05-24 PROCEDURE — 80178 ASSAY OF LITHIUM: CPT | Performed by: NURSE PRACTITIONER

## 2021-05-24 PROCEDURE — 80306 DRUG TEST PRSMV INSTRMNT: CPT | Performed by: NURSE PRACTITIONER

## 2021-05-25 LAB
CHOLEST SERPL-MCNC: 222 MG/DL
HDLC SERPL-MCNC: 39 MG/DL
LDLC SERPL CALC-MCNC: 152 MG/DL
NONHDLC SERPL-MCNC: 183 MG/DL
TRIGL SERPL-MCNC: 155 MG/DL

## 2021-06-04 ENCOUNTER — RECORDS - HEALTHEAST (OUTPATIENT)
Dept: ADMINISTRATIVE | Facility: CLINIC | Age: 26
End: 2021-06-04

## 2021-06-10 ENCOUNTER — OFFICE VISIT (OUTPATIENT)
Dept: URGENT CARE | Facility: URGENT CARE | Age: 26
End: 2021-06-10
Payer: COMMERCIAL

## 2021-06-10 VITALS
HEART RATE: 100 BPM | TEMPERATURE: 98.9 F | WEIGHT: 239.4 LBS | SYSTOLIC BLOOD PRESSURE: 139 MMHG | BODY MASS INDEX: 32.47 KG/M2 | OXYGEN SATURATION: 96 % | RESPIRATION RATE: 16 BRPM | DIASTOLIC BLOOD PRESSURE: 98 MMHG

## 2021-06-10 DIAGNOSIS — F19.10 DRUG ABUSE (H): ICD-10-CM

## 2021-06-10 DIAGNOSIS — I10 HYPERTENSION, BENIGN ESSENTIAL, GOAL BELOW 140/90: ICD-10-CM

## 2021-06-10 DIAGNOSIS — F25.0 SCHIZOAFFECTIVE DISORDER, BIPOLAR TYPE (H): Primary | ICD-10-CM

## 2021-06-10 DIAGNOSIS — Z72.0 TOBACCO ABUSE: ICD-10-CM

## 2021-06-10 PROCEDURE — 99215 OFFICE O/P EST HI 40 MIN: CPT | Performed by: NURSE PRACTITIONER

## 2021-06-10 RX ORDER — GABAPENTIN 300 MG/1
CAPSULE ORAL
Status: CANCELLED | OUTPATIENT
Start: 2021-06-10

## 2021-06-10 RX ORDER — LITHIUM CARBONATE 450 MG
900 TABLET, EXTENDED RELEASE ORAL DAILY
Status: CANCELLED | OUTPATIENT
Start: 2021-06-10

## 2021-06-10 RX ORDER — GABAPENTIN 300 MG/1
CAPSULE ORAL
COMMUNITY
Start: 2021-05-29

## 2021-06-10 RX ORDER — OLANZAPINE 15 MG/1
15 TABLET ORAL AT BEDTIME
Status: CANCELLED | OUTPATIENT
Start: 2021-06-10

## 2021-06-10 RX ORDER — OLANZAPINE 10 MG/1
10 TABLET ORAL
Status: CANCELLED | OUTPATIENT
Start: 2021-06-10

## 2021-06-10 ASSESSMENT — PAIN SCALES - GENERAL: PAINLEVEL: NO PAIN (0)

## 2021-06-10 NOTE — PROGRESS NOTES
Assessment & Plan     Schizoaffective disorder, bipolar type (H)      Tobacco abuse      Hypertension, benign essential, goal below 140/90      Drug abuse (H)       With his history of drug abuse and schizophrenia recommended evaluation now in emergency room for further evaluation of psychosis medications and patient and  are agreeable, as writer not able to manage his medications and psychiatrist already recommended emergency room evaluation. Ambulance is declines as worker will drive him. Patient is discharged in stable condition.     Lynne Lazcano NP  [facility] SKYENOAH Hoover is a 26 year old male who presents to clinic today with his  for the following health issues:  Chief Complaint   Patient presents with     Psychiatric Problem     Patient states that he needs to get back to taking his medicaiton- has been off of his medication for the past 4 days- patient states that he has been restless.     Patient was evaluated at [facility] 5/27/21 for auditory hallucinations and [facility] on 6/7/21 for crisis assessment when his group home said he cannot return. He did not meet criteria for 72-hour hold and was discharged to the streets per patient request per chart review. He has a history of schizophrenia and psychosis along with methamphetamine use.      is bringing patient in to be evaluated to see if it is safe to restart his lithium. He stopped his lithium Monday since he tested positive for meth which cannot be taken with lithium.  states he has been responding to voice and has been restless. He returned to Group home today and states he was in homeless shelter since discharged from hospital on 6/7. He has been using marijuana and states last meth use was 15 days ago. He has not taken any medications for a few days. Denies chest pain, shortness of breath, headache. His regular  psychiatist recommends bringing him to hospital and  doesn't want to do that since she fears he will be discharged again to the streets. Patient denies hearing voices and denies suicidal ideation.     Problem list, Medication list, Allergies, and Medical history reviewed in EPIC.    ROS:  Review of systems negative except for noted above        Objective    BP (!) 139/98   Pulse 100   Temp 98.9  F (37.2  C) (Tympanic)   Resp 16   Wt 108.6 kg (239 lb 6.4 oz)   SpO2 96%   BMI 32.47 kg/m    Physical Exam  Constitutional:       General: He is not in acute distress.     Appearance: He is not toxic-appearing or diaphoretic.   HENT:      Head: Normocephalic and atraumatic.   Pulmonary:      Effort: Pulmonary effort is normal. No respiratory distress.   Neurological:      Mental Status: He is alert.   Psychiatric:         Attention and Perception: He perceives auditory hallucinations.         Mood and Affect: Mood and affect normal.      Comments: Patient is whispering to self during visit